# Patient Record
Sex: MALE | Race: BLACK OR AFRICAN AMERICAN | Employment: FULL TIME | URBAN - METROPOLITAN AREA
[De-identification: names, ages, dates, MRNs, and addresses within clinical notes are randomized per-mention and may not be internally consistent; named-entity substitution may affect disease eponyms.]

---

## 2017-06-12 ENCOUNTER — TELEPHONE (OUTPATIENT)
Dept: INTERNAL MEDICINE CLINIC | Age: 52
End: 2017-06-12

## 2017-06-12 DIAGNOSIS — M54.2 NECK PAIN: Primary | ICD-10-CM

## 2018-07-16 ENCOUNTER — OFFICE VISIT (OUTPATIENT)
Dept: INTERNAL MEDICINE CLINIC | Age: 53
End: 2018-07-16
Payer: COMMERCIAL

## 2018-07-16 VITALS
WEIGHT: 166 LBS | DIASTOLIC BLOOD PRESSURE: 78 MMHG | SYSTOLIC BLOOD PRESSURE: 126 MMHG | RESPIRATION RATE: 18 BRPM | OXYGEN SATURATION: 100 % | HEIGHT: 71 IN | HEART RATE: 68 BPM | BODY MASS INDEX: 23.24 KG/M2

## 2018-07-16 DIAGNOSIS — M54.2 MUSCULOSKELETAL NECK PAIN: ICD-10-CM

## 2018-07-16 DIAGNOSIS — M62.838 TRAPEZIUS MUSCLE SPASM: Primary | ICD-10-CM

## 2018-07-16 DIAGNOSIS — N52.8 OTHER MALE ERECTILE DYSFUNCTION: ICD-10-CM

## 2018-07-16 DIAGNOSIS — G47.00 INSOMNIA, UNSPECIFIED TYPE: ICD-10-CM

## 2018-07-16 DIAGNOSIS — Z72.0 TOBACCO ABUSE: ICD-10-CM

## 2018-07-16 DIAGNOSIS — R53.83 OTHER FATIGUE: ICD-10-CM

## 2018-07-16 DIAGNOSIS — Z71.6 TOBACCO ABUSE COUNSELING: ICD-10-CM

## 2018-07-16 PROCEDURE — G8420 CALC BMI NORM PARAMETERS: HCPCS | Performed by: FAMILY MEDICINE

## 2018-07-16 PROCEDURE — 4004F PT TOBACCO SCREEN RCVD TLK: CPT | Performed by: FAMILY MEDICINE

## 2018-07-16 PROCEDURE — 99213 OFFICE O/P EST LOW 20 MIN: CPT | Performed by: FAMILY MEDICINE

## 2018-07-16 PROCEDURE — G8427 DOCREV CUR MEDS BY ELIG CLIN: HCPCS | Performed by: FAMILY MEDICINE

## 2018-07-16 PROCEDURE — 3017F COLORECTAL CA SCREEN DOC REV: CPT | Performed by: FAMILY MEDICINE

## 2018-07-16 RX ORDER — CYCLOBENZAPRINE HCL 5 MG
5 TABLET ORAL 2 TIMES DAILY PRN
Qty: 30 TABLET | Refills: 0 | Status: SHIPPED | OUTPATIENT
Start: 2018-07-16 | End: 2018-07-26

## 2018-07-16 RX ORDER — DULOXETIN HYDROCHLORIDE 30 MG/1
30 CAPSULE, DELAYED RELEASE ORAL DAILY
Qty: 30 CAPSULE | Refills: 3 | Status: SHIPPED | OUTPATIENT
Start: 2018-07-16 | End: 2019-08-01 | Stop reason: CLARIF

## 2018-07-16 ASSESSMENT — PATIENT HEALTH QUESTIONNAIRE - PHQ9
SUM OF ALL RESPONSES TO PHQ QUESTIONS 1-9: 0
1. LITTLE INTEREST OR PLEASURE IN DOING THINGS: 0
2. FEELING DOWN, DEPRESSED OR HOPELESS: 0
SUM OF ALL RESPONSES TO PHQ9 QUESTIONS 1 & 2: 0

## 2018-07-17 ASSESSMENT — ENCOUNTER SYMPTOMS
EYES NEGATIVE: 1
RESPIRATORY NEGATIVE: 1
GASTROINTESTINAL NEGATIVE: 1
ALLERGIC/IMMUNOLOGIC NEGATIVE: 1

## 2018-07-17 NOTE — PROGRESS NOTES
Subjective:      Patient ID: Barb Arrington is a 48 y.o. male. Neck Pain    This is a chronic problem. The current episode started more than 1 month ago. The problem occurs constantly. The problem has been unchanged. The pain is associated with an unknown factor. The pain is present in the left side. The quality of the pain is described as cramping. The pain is at a severity of 5/10. The pain is moderate. The symptoms are aggravated by stress. The pain is same all the time. Stiffness is present all day. He has tried NSAIDs for the symptoms. The treatment provided mild relief. Review of Systems   Constitutional: Negative. HENT: Negative. Eyes: Negative. Respiratory: Negative. Cardiovascular: Negative. Gastrointestinal: Negative. Endocrine: Negative. Musculoskeletal: Positive for arthralgias, myalgias and neck pain. Skin: Negative. Allergic/Immunologic: Negative. Neurological: Negative. Hematological: Negative. Psychiatric/Behavioral: Positive for dysphoric mood. The patient is nervous/anxious. Past family and social history unremarkable. Objective:   Physical Exam   Constitutional: He is oriented to person, place, and time. He appears well-developed and well-nourished. HENT:   Head: Normocephalic and atraumatic. Right Ear: External ear normal.   Left Ear: External ear normal.   Nose: Nose normal.   Mouth/Throat: Oropharynx is clear and moist.   Eyes: Conjunctivae and EOM are normal. Pupils are equal, round, and reactive to light. Neck: Normal range of motion. Neck supple. Cardiovascular: Normal rate, regular rhythm, normal heart sounds and intact distal pulses. Pulmonary/Chest: Effort normal and breath sounds normal.   Abdominal: Soft. Bowel sounds are normal.   Musculoskeletal: Normal range of motion. Left sided trapezius spasm. No interspinous tenderness or step-off. Good active range of motion.   Neurologically intact   Neurological: He is alert and oriented to person, place, and time. He has normal reflexes. Skin: Skin is warm and dry. Psychiatric:   Anxiety with underlying depression. Chronic pain syndrome       Assessment:       Diagnosis Orders   1. Trapezius muscle spasm  XR CERVICAL SPINE (2-3 VIEWS)    External Referral To Physical Therapy   2. Musculoskeletal neck pain  XR CERVICAL SPINE (2-3 VIEWS)    External Referral To Physical Therapy   3. Insomnia, unspecified type  Urine Drug Screen    Hepatitis Panel, Acute    HIV Rapid 1&2    VDRL, QUANTITATIVE    CBC    Comprehensive Metabolic Panel    Hemoglobin A1C    Lipid Panel    Urinalysis with Microscopic    TSH without Reflex    Psa screening   4. Other fatigue  Urine Drug Screen    Hepatitis Panel, Acute    HIV Rapid 1&2    VDRL, QUANTITATIVE    CBC    Comprehensive Metabolic Panel    Hemoglobin A1C    Lipid Panel    Urinalysis with Microscopic    TSH without Reflex    Psa screening   5. Other male erectile dysfunction     6. Tobacco abuse     7. Tobacco abuse counseling             Plan:      51-year-old right-hand-dominant -American male who is employed at [employer] is presented with subjective complaint of neck pain. Patient is established with pain management. Clinical examination is consistent with left trapezius spasm. .  Reassurance provided. Continue increasing range of motion as tolerated. Over-the-counter massage cream, moist heat. He is placed on muscle relaxant. Sedative precaution provided. Caution driving and operating  Underlying history of chronic pain syndrome. Patient is advised to continue follow-up with his pain management. He expresses significant anxiety. I placed him on Cymbalta  Subjective complaint of fatigue is multifactorial.  We will follow along with the labs. Reassurance provided. Observe closely  Erectile dysfunction  He continued to smoke despite advise.   Once again is counseled, quit date, alternatives to consider  Insomnia that appears to be

## 2019-08-01 ENCOUNTER — OFFICE VISIT (OUTPATIENT)
Dept: INTERNAL MEDICINE CLINIC | Age: 54
End: 2019-08-01
Payer: COMMERCIAL

## 2019-08-01 VITALS
WEIGHT: 177.2 LBS | DIASTOLIC BLOOD PRESSURE: 80 MMHG | OXYGEN SATURATION: 98 % | HEART RATE: 62 BPM | BODY MASS INDEX: 24.81 KG/M2 | SYSTOLIC BLOOD PRESSURE: 122 MMHG | RESPIRATION RATE: 15 BRPM | HEIGHT: 71 IN

## 2019-08-01 DIAGNOSIS — N20.0 RENAL STONES: ICD-10-CM

## 2019-08-01 DIAGNOSIS — J34.2 DEFLECTED NASAL SEPTUM: ICD-10-CM

## 2019-08-01 DIAGNOSIS — J31.0 CHRONIC RHINITIS: ICD-10-CM

## 2019-08-01 DIAGNOSIS — J34.3 HYPERTROPHY OF BOTH INFERIOR NASAL TURBINATES: ICD-10-CM

## 2019-08-01 DIAGNOSIS — N52.01 ERECTILE DYSFUNCTION DUE TO ARTERIAL INSUFFICIENCY: ICD-10-CM

## 2019-08-01 DIAGNOSIS — Z87.891 FORMER SMOKER: ICD-10-CM

## 2019-08-01 DIAGNOSIS — R04.0 EPISTAXIS: Primary | ICD-10-CM

## 2019-08-01 PROBLEM — Z71.6 TOBACCO ABUSE COUNSELING: Status: RESOLVED | Noted: 2018-07-16 | Resolved: 2019-08-01

## 2019-08-01 PROCEDURE — 99214 OFFICE O/P EST MOD 30 MIN: CPT | Performed by: FAMILY MEDICINE

## 2019-08-01 PROCEDURE — 1036F TOBACCO NON-USER: CPT | Performed by: FAMILY MEDICINE

## 2019-08-01 PROCEDURE — 3017F COLORECTAL CA SCREEN DOC REV: CPT | Performed by: FAMILY MEDICINE

## 2019-08-01 PROCEDURE — G8420 CALC BMI NORM PARAMETERS: HCPCS | Performed by: FAMILY MEDICINE

## 2019-08-01 PROCEDURE — G8427 DOCREV CUR MEDS BY ELIG CLIN: HCPCS | Performed by: FAMILY MEDICINE

## 2019-08-01 RX ORDER — ECHINACEA PURPUREA EXTRACT 125 MG
1 TABLET ORAL PRN
Qty: 1 BOTTLE | Refills: 3 | Status: SHIPPED | OUTPATIENT
Start: 2019-08-01 | End: 2020-09-21

## 2019-08-01 RX ORDER — TADALAFIL 5 MG/1
5 TABLET ORAL DAILY PRN
Qty: 10 TABLET | Refills: 2 | Status: SHIPPED | OUTPATIENT
Start: 2019-08-01 | End: 2019-11-26 | Stop reason: SDUPTHER

## 2019-08-01 ASSESSMENT — ENCOUNTER SYMPTOMS
GASTROINTESTINAL NEGATIVE: 1
EYES NEGATIVE: 1
ALLERGIC/IMMUNOLOGIC NEGATIVE: 1
RESPIRATORY NEGATIVE: 1

## 2019-08-01 ASSESSMENT — PATIENT HEALTH QUESTIONNAIRE - PHQ9
SUM OF ALL RESPONSES TO PHQ QUESTIONS 1-9: 0
SUM OF ALL RESPONSES TO PHQ9 QUESTIONS 1 & 2: 0
SUM OF ALL RESPONSES TO PHQ QUESTIONS 1-9: 0
1. LITTLE INTEREST OR PLEASURE IN DOING THINGS: 0
2. FEELING DOWN, DEPRESSED OR HOPELESS: 0

## 2019-11-26 RX ORDER — TADALAFIL 5 MG/1
5 TABLET ORAL DAILY PRN
Qty: 10 TABLET | Refills: 1 | Status: SHIPPED | OUTPATIENT
Start: 2019-11-26 | End: 2020-01-03 | Stop reason: ALTCHOICE

## 2020-01-03 ENCOUNTER — OFFICE VISIT (OUTPATIENT)
Dept: INTERNAL MEDICINE CLINIC | Age: 55
End: 2020-01-03
Payer: COMMERCIAL

## 2020-01-03 VITALS
RESPIRATION RATE: 18 BRPM | HEIGHT: 71 IN | SYSTOLIC BLOOD PRESSURE: 130 MMHG | BODY MASS INDEX: 25.98 KG/M2 | OXYGEN SATURATION: 98 % | TEMPERATURE: 96 F | HEART RATE: 70 BPM | DIASTOLIC BLOOD PRESSURE: 80 MMHG | WEIGHT: 185.6 LBS

## 2020-01-03 PROBLEM — M17.0 PRIMARY OSTEOARTHRITIS OF BOTH KNEES: Status: ACTIVE | Noted: 2020-01-03

## 2020-01-03 PROBLEM — Z83.3 FAMILY HISTORY OF DIABETES MELLITUS (DM): Status: ACTIVE | Noted: 2020-01-03

## 2020-01-03 PROBLEM — J34.2 DEFLECTED NASAL SEPTUM: Status: RESOLVED | Noted: 2019-08-01 | Resolved: 2020-01-03

## 2020-01-03 PROBLEM — R04.0 EPISTAXIS: Status: RESOLVED | Noted: 2019-08-01 | Resolved: 2020-01-03

## 2020-01-03 PROBLEM — J34.3 HYPERTROPHY OF BOTH INFERIOR NASAL TURBINATES: Status: RESOLVED | Noted: 2019-08-01 | Resolved: 2020-01-03

## 2020-01-03 PROCEDURE — 3017F COLORECTAL CA SCREEN DOC REV: CPT | Performed by: FAMILY MEDICINE

## 2020-01-03 PROCEDURE — G8484 FLU IMMUNIZE NO ADMIN: HCPCS | Performed by: FAMILY MEDICINE

## 2020-01-03 PROCEDURE — 1036F TOBACCO NON-USER: CPT | Performed by: FAMILY MEDICINE

## 2020-01-03 PROCEDURE — G8419 CALC BMI OUT NRM PARAM NOF/U: HCPCS | Performed by: FAMILY MEDICINE

## 2020-01-03 PROCEDURE — G8427 DOCREV CUR MEDS BY ELIG CLIN: HCPCS | Performed by: FAMILY MEDICINE

## 2020-01-03 PROCEDURE — 99214 OFFICE O/P EST MOD 30 MIN: CPT | Performed by: FAMILY MEDICINE

## 2020-01-03 RX ORDER — SILDENAFIL 50 MG/1
50 TABLET, FILM COATED ORAL PRN
Qty: 10 TABLET | Refills: 1 | Status: SHIPPED | OUTPATIENT
Start: 2020-01-03 | End: 2020-01-15 | Stop reason: DRUGHIGH

## 2020-01-03 ASSESSMENT — PATIENT HEALTH QUESTIONNAIRE - PHQ9
SUM OF ALL RESPONSES TO PHQ9 QUESTIONS 1 & 2: 0
SUM OF ALL RESPONSES TO PHQ QUESTIONS 1-9: 0
1. LITTLE INTEREST OR PLEASURE IN DOING THINGS: 0
2. FEELING DOWN, DEPRESSED OR HOPELESS: 0
SUM OF ALL RESPONSES TO PHQ QUESTIONS 1-9: 0

## 2020-01-03 ASSESSMENT — ENCOUNTER SYMPTOMS
ALLERGIC/IMMUNOLOGIC NEGATIVE: 1
EYES NEGATIVE: 1
GASTROINTESTINAL NEGATIVE: 1
RESPIRATORY NEGATIVE: 1

## 2020-01-14 ENCOUNTER — TELEPHONE (OUTPATIENT)
Dept: INTERNAL MEDICINE CLINIC | Age: 55
End: 2020-01-14

## 2020-01-14 RX ORDER — SILDENAFIL CITRATE 50 MG
50 TABLET ORAL PRN
Qty: 10 TABLET | Refills: 1 | Status: CANCELLED | OUTPATIENT
Start: 2020-01-14

## 2020-01-14 NOTE — TELEPHONE ENCOUNTER
Pt called to request brand name viagra at higher dose  Filled generic viagra 50 mg 1/3/20 #10 with 1 RF

## 2020-01-15 RX ORDER — SILDENAFIL CITRATE 100 MG
100 TABLET ORAL PRN
Qty: 30 TABLET | Refills: 3 | Status: SHIPPED | OUTPATIENT
Start: 2020-01-15 | End: 2020-01-16 | Stop reason: SDUPTHER

## 2020-01-15 NOTE — TELEPHONE ENCOUNTER
Viagra 100 mg p.o. as needed #10 with 1 refill.   He may discuss with his urologist Dr. Brock Darnell

## 2020-01-15 NOTE — TELEPHONE ENCOUNTER
Patient mail box full unable to leave message  Medication may not be covered patient may have to pay out of pocket

## 2020-01-16 RX ORDER — SILDENAFIL 100 MG/1
100 TABLET, FILM COATED ORAL PRN
Qty: 30 TABLET | Refills: 3
Start: 2020-01-16 | End: 2020-09-21 | Stop reason: ALTCHOICE

## 2020-03-09 RX ORDER — TADALAFIL 5 MG/1
TABLET ORAL
Qty: 15 TABLET | Refills: 3 | Status: SHIPPED | OUTPATIENT
Start: 2020-03-09 | End: 2020-09-21

## 2020-03-10 ENCOUNTER — TELEPHONE (OUTPATIENT)
Dept: ENDOCRINOLOGY | Age: 55
End: 2020-03-10

## 2020-09-21 ENCOUNTER — OFFICE VISIT (OUTPATIENT)
Dept: INTERNAL MEDICINE CLINIC | Age: 55
End: 2020-09-21
Payer: COMMERCIAL

## 2020-09-21 VITALS
TEMPERATURE: 97.7 F | BODY MASS INDEX: 25.48 KG/M2 | SYSTOLIC BLOOD PRESSURE: 148 MMHG | WEIGHT: 182 LBS | HEIGHT: 71 IN | OXYGEN SATURATION: 98 % | HEART RATE: 72 BPM | DIASTOLIC BLOOD PRESSURE: 88 MMHG

## 2020-09-21 PROCEDURE — 99214 OFFICE O/P EST MOD 30 MIN: CPT | Performed by: FAMILY MEDICINE

## 2020-09-21 RX ORDER — BUSPIRONE HYDROCHLORIDE 7.5 MG/1
7.5 TABLET ORAL 2 TIMES DAILY
Qty: 60 TABLET | Refills: 0 | Status: SHIPPED | OUTPATIENT
Start: 2020-09-21 | End: 2020-10-21

## 2020-09-21 NOTE — PROGRESS NOTES
Subjective:      Patient ID: Valeria Graham is a 50 y.o. male. 53-year-old -American male who says that his mother recently  and is becoming very anxious, however, he denies being depressed or suicidal      Review of Systems   Constitutional: Negative. HENT: Negative. Eyes: Negative. Respiratory: Negative. Cardiovascular: Negative. Gastrointestinal: Negative. Endocrine: Negative. Musculoskeletal: Negative. Skin: Negative. Allergic/Immunologic: Negative. Neurological: Negative. Hematological: Negative. Psychiatric/Behavioral: The patient is nervous/anxious. Past family and social history unremarkable. Diagnosis Orders   1. Erectile dysfunction due to arterial insufficiency     2. Renal stones  CBC    Comprehensive Metabolic Panel    Hemoglobin A1C    Lipid Panel    Urinalysis with Microscopic    TSH without Reflex    Psa screening   3. Former smoker     4. Chronic rhinitis     5. Primary osteoarthritis of both knees  [address]   6. Family history of diabetes mellitus (DM)  CBC    Comprehensive Metabolic Panel    Hemoglobin A1C    Lipid Panel    Urinalysis with Microscopic    TSH without Reflex    Psa screening   7. PTSD (post-traumatic stress disorder)  CBC    Comprehensive Metabolic Panel    Hemoglobin A1C    Lipid Panel    Urinalysis with Microscopic    TSH without Reflex    Psa screening    Vernell Flynn, PhD, Psychology, [place]         Objective:   Physical Exam  Vitals signs and nursing note reviewed. Constitutional:       Appearance: He is well-developed. HENT:      Head: Normocephalic and atraumatic. Right Ear: External ear normal.      Left Ear: External ear normal.      Nose: Nose normal.      Comments: Allergies allergic rhinitis  Eyes:      Conjunctiva/sclera: Conjunctivae normal.      Pupils: Pupils are equal, round, and reactive to light. Neck:      Musculoskeletal: Normal range of motion and neck supple. Cardiovascular:      Rate and Rhythm: Normal rate and regular rhythm. Heart sounds: Normal heart sounds. Pulmonary:      Effort: Pulmonary effort is normal.      Breath sounds: Normal breath sounds. Abdominal:      General: Bowel sounds are normal.      Palpations: Abdomen is soft. Genitourinary:     Comments: Asymptomatic kidney stones  Erectile dysfunction  Musculoskeletal: Normal range of motion. Comments: Arthritis of the knees   Skin:     General: Skin is warm and dry. Neurological:      Mental Status: He is alert and oriented to person, place, and time. Deep Tendon Reflexes: Reflexes are normal and symmetric. Psychiatric:      Comments: Anxiety, PTSD  He denies suicidality  He agrees to [employer] and psych consultation         Assessment:       Diagnosis Orders   1. Erectile dysfunction due to arterial insufficiency     2. Renal stones  CBC    Comprehensive Metabolic Panel    Hemoglobin A1C    Lipid Panel    Urinalysis with Microscopic    TSH without Reflex    Psa screening   3. Former smoker     4. Chronic rhinitis     5. Primary osteoarthritis of both knees  [address]   6. Family history of diabetes mellitus (DM)  CBC    Comprehensive Metabolic Panel    Hemoglobin A1C    Lipid Panel    Urinalysis with Microscopic    TSH without Reflex    Psa screening   7. PTSD (post-traumatic stress disorder)  CBC    Comprehensive Metabolic Panel    Hemoglobin A1C    Lipid Panel    Urinalysis with Microscopic    TSH without Reflex    Psa screening    Kb Bruce, PhD, Psychology, [facility]           Plan:      46-year-old -American male is presenting complaining of increasing anxiety since recent. However, he denies being depressed. Patient is counseled. He agrees to [employer] and psych consultation. History of arthritis of the knees. Asymptomatic. Continue activity as tolerated, quadricep strengthening program as demonstrated.   May take over-the-counter pain medications as needed  Erectile dysfunction  Positive family history of diabetes mellitus. Risk factor stratification is advised. He will benefit from low-fat high-fiber diet, daily moderate exercise and lifestyle change  History of asymptomatic kidney stone. Maintain oral hydration  History of allergies allergic rhinitis. No recent flare. He may take over-the-counter nasal saline, antihistamines  Former smoker  History of renal insufficiency. Maintain oral hydration, avoidance of nephrotoxic drugs, anti-inflammatory radiocontrast  He denies excessive alcohol or illicit drug use  Medically stable advised to continue  Further recommendations to follow labs  He is encouraged to call for any concern  This note is created with a voice recognition program and while intend to generate a document that accurately reflects the content of the visit, no guarantee can be provided that every mistake has been identified and corrected by editing.           Yadi Baker MD

## 2020-09-22 ENCOUNTER — TELEPHONE (OUTPATIENT)
Dept: INTERNAL MEDICINE CLINIC | Age: 55
End: 2020-09-22

## 2020-09-22 NOTE — TELEPHONE ENCOUNTER
Spoke with 's OT. States they do not perform services related to work abilities. Recommends pt contact HR for further guidance. Please advise.

## 2020-09-22 NOTE — TELEPHONE ENCOUNTER
Occupational Therapy consult was requested because patient wished to have time off from work because of his personal situations

## 2020-09-23 NOTE — TELEPHONE ENCOUNTER
[facility] called back this morning. States she spoke with supervisor who advised her pt needs referral to PT with note for FCE, done.

## 2023-01-31 NOTE — TELEPHONE ENCOUNTER
[facility] stating that they received an order for OT but they believe that it should have been for PT    Asking if so please put in a new order    please advise Warm

## 2023-10-05 ENCOUNTER — OFFICE VISIT (OUTPATIENT)
Dept: PRIMARY CARE CLINIC | Age: 58
End: 2023-10-05
Payer: COMMERCIAL

## 2023-10-05 VITALS
HEIGHT: 71 IN | BODY MASS INDEX: 24.36 KG/M2 | OXYGEN SATURATION: 98 % | RESPIRATION RATE: 16 BRPM | SYSTOLIC BLOOD PRESSURE: 132 MMHG | HEART RATE: 64 BPM | WEIGHT: 174 LBS | DIASTOLIC BLOOD PRESSURE: 82 MMHG

## 2023-10-05 DIAGNOSIS — Z13.0 SCREENING FOR DEFICIENCY ANEMIA: ICD-10-CM

## 2023-10-05 DIAGNOSIS — R21 RASH OF MULTIPLE FINGERS OF BOTH HANDS: Primary | ICD-10-CM

## 2023-10-05 DIAGNOSIS — Z23 NEED FOR INFLUENZA VACCINATION: ICD-10-CM

## 2023-10-05 DIAGNOSIS — Z12.11 SCREEN FOR COLON CANCER: ICD-10-CM

## 2023-10-05 DIAGNOSIS — Z12.5 SCREENING PSA (PROSTATE SPECIFIC ANTIGEN): ICD-10-CM

## 2023-10-05 DIAGNOSIS — Z13.220 ENCOUNTER FOR LIPID SCREENING FOR CARDIOVASCULAR DISEASE: ICD-10-CM

## 2023-10-05 DIAGNOSIS — Z13.6 ENCOUNTER FOR LIPID SCREENING FOR CARDIOVASCULAR DISEASE: ICD-10-CM

## 2023-10-05 PROBLEM — N13.9 OBSTRUCTIVE UROPATHY: Status: ACTIVE | Noted: 2018-02-03

## 2023-10-05 PROCEDURE — 1036F TOBACCO NON-USER: CPT | Performed by: PHYSICIAN ASSISTANT

## 2023-10-05 PROCEDURE — 99203 OFFICE O/P NEW LOW 30 MIN: CPT | Performed by: PHYSICIAN ASSISTANT

## 2023-10-05 PROCEDURE — G8427 DOCREV CUR MEDS BY ELIG CLIN: HCPCS | Performed by: PHYSICIAN ASSISTANT

## 2023-10-05 PROCEDURE — 90471 IMMUNIZATION ADMIN: CPT | Performed by: PHYSICIAN ASSISTANT

## 2023-10-05 PROCEDURE — G8482 FLU IMMUNIZE ORDER/ADMIN: HCPCS | Performed by: PHYSICIAN ASSISTANT

## 2023-10-05 PROCEDURE — 90674 CCIIV4 VAC NO PRSV 0.5 ML IM: CPT | Performed by: PHYSICIAN ASSISTANT

## 2023-10-05 PROCEDURE — G8420 CALC BMI NORM PARAMETERS: HCPCS | Performed by: PHYSICIAN ASSISTANT

## 2023-10-05 PROCEDURE — 3017F COLORECTAL CA SCREEN DOC REV: CPT | Performed by: PHYSICIAN ASSISTANT

## 2023-10-05 RX ORDER — TRIAMCINOLONE ACETONIDE 0.25 MG/G
OINTMENT TOPICAL
Qty: 80 G | Refills: 0 | Status: SHIPPED | OUTPATIENT
Start: 2023-10-05 | End: 2023-10-12

## 2023-10-05 SDOH — ECONOMIC STABILITY: INCOME INSECURITY: HOW HARD IS IT FOR YOU TO PAY FOR THE VERY BASICS LIKE FOOD, HOUSING, MEDICAL CARE, AND HEATING?: NOT HARD AT ALL

## 2023-10-05 SDOH — ECONOMIC STABILITY: FOOD INSECURITY: WITHIN THE PAST 12 MONTHS, YOU WORRIED THAT YOUR FOOD WOULD RUN OUT BEFORE YOU GOT MONEY TO BUY MORE.: NEVER TRUE

## 2023-10-05 SDOH — ECONOMIC STABILITY: HOUSING INSECURITY
IN THE LAST 12 MONTHS, WAS THERE A TIME WHEN YOU DID NOT HAVE A STEADY PLACE TO SLEEP OR SLEPT IN A SHELTER (INCLUDING NOW)?: NO

## 2023-10-05 SDOH — ECONOMIC STABILITY: FOOD INSECURITY: WITHIN THE PAST 12 MONTHS, THE FOOD YOU BOUGHT JUST DIDN'T LAST AND YOU DIDN'T HAVE MONEY TO GET MORE.: NEVER TRUE

## 2023-10-05 ASSESSMENT — PATIENT HEALTH QUESTIONNAIRE - PHQ9
SUM OF ALL RESPONSES TO PHQ QUESTIONS 1-9: 0
2. FEELING DOWN, DEPRESSED OR HOPELESS: 0
SUM OF ALL RESPONSES TO PHQ9 QUESTIONS 1 & 2: 0
1. LITTLE INTEREST OR PLEASURE IN DOING THINGS: 0
SUM OF ALL RESPONSES TO PHQ QUESTIONS 1-9: 0

## 2023-10-05 ASSESSMENT — ENCOUNTER SYMPTOMS
SHORTNESS OF BREATH: 0
VOMITING: 0
NAUSEA: 0
EYE PAIN: 0
RHINORRHEA: 0
BACK PAIN: 0
COUGH: 0
ABDOMINAL PAIN: 0
CONSTIPATION: 0
DIARRHEA: 0
SINUS PAIN: 0

## 2023-10-05 NOTE — PROGRESS NOTES
[address] [place] [address] PRIMARY CARE  [address]  1 [facility] ,[address] [address] [address] [address]  Dept: [phone]  Dept Fax: [phone]    Jessica Boyd is a 66 y.o. male who presents today for his medical conditions/complaints as noted below. Chief Complaint   Patient presents with    Establish Care     Rash on hands on and off x 3 months, has used OTC creams and medications with no relief, has had no changes to soaps or shampoos or detergents       HPI:     Presents to the office to establish care. No recent PCP. He does not take daily medication for chronic medical illness. Primary concern today is discussing rash on his hands. This been intermittent for the last 3 months. It is concentrated to the interdigital space on both hands. Right hand is worse than left. He describes extreme itching and irritations when it is at its worst.  He denies any new exposures to soaps, washes, chemicals, detergents. No new pets. He has tried [employer], cortisone, Aquaphor, Benadryl with minimal relief. Denies any rash on any other area of body. No history of similar rash. Otherwise, no concerns or complaints. Denies any lightheadedness, dizziness, shortness of breath, chest pain, leg edema, syncope. We reviewed health maintenance and we will update accordingly. BP stable. Weight stable. No results found for: \"LABA1C\"          ( goal A1C is < 7)   No components found for: \"LABMICR\"  LDL Cholesterol (mg/dL)   Date Value   03/28/2016 114       (goal LDL is <100)   AST (U/L)   Date Value   03/28/2016 16     ALT (U/L)   Date Value   03/28/2016 14     BUN (mg/dL)   Date Value   03/28/2016 13     BP Readings from Last 3 Encounters:   10/05/23 132/82   09/21/20 (!) 148/88   01/03/20 130/80          (goal 120/80)    History reviewed. No pertinent past medical history.    Past Surgical History:   Procedure Laterality Date    PRE-MALIGNANT / BENIGN SKIN LESION EXCISION  3/21/13    excision

## 2024-06-11 ENCOUNTER — HOSPITAL ENCOUNTER (OUTPATIENT)
Age: 59
Discharge: HOME | End: 2024-06-13
Payer: COMMERCIAL

## 2024-06-11 ENCOUNTER — HOSPITAL ENCOUNTER (OUTPATIENT)
Dept: GENERAL RADIOLOGY | Age: 59
Discharge: HOME | End: 2024-06-13
Payer: COMMERCIAL

## 2024-06-11 ENCOUNTER — OFFICE VISIT (OUTPATIENT)
Dept: PRIMARY CARE CLINIC | Age: 59
End: 2024-06-11
Payer: COMMERCIAL

## 2024-06-11 VITALS
SYSTOLIC BLOOD PRESSURE: 126 MMHG | BODY MASS INDEX: 25.52 KG/M2 | WEIGHT: 183 LBS | RESPIRATION RATE: 12 BRPM | HEART RATE: 64 BPM | OXYGEN SATURATION: 98 % | DIASTOLIC BLOOD PRESSURE: 76 MMHG

## 2024-06-11 DIAGNOSIS — M79.671 PAIN OF RIGHT HEEL: ICD-10-CM

## 2024-06-11 DIAGNOSIS — M79.671 PAIN OF RIGHT HEEL: Primary | ICD-10-CM

## 2024-06-11 PROCEDURE — 1036F TOBACCO NON-USER: CPT | Performed by: NURSE PRACTITIONER

## 2024-06-11 PROCEDURE — 73630 X-RAY EXAM OF FOOT: CPT

## 2024-06-11 PROCEDURE — G8427 DOCREV CUR MEDS BY ELIG CLIN: HCPCS | Performed by: NURSE PRACTITIONER

## 2024-06-11 PROCEDURE — G8419 CALC BMI OUT NRM PARAM NOF/U: HCPCS | Performed by: NURSE PRACTITIONER

## 2024-06-11 PROCEDURE — 3017F COLORECTAL CA SCREEN DOC REV: CPT | Performed by: NURSE PRACTITIONER

## 2024-06-11 PROCEDURE — 99213 OFFICE O/P EST LOW 20 MIN: CPT | Performed by: NURSE PRACTITIONER

## 2024-06-11 RX ORDER — METHYLPREDNISOLONE 4 MG/1
TABLET ORAL
Qty: 1 KIT | Refills: 0 | Status: SHIPPED | OUTPATIENT
Start: 2024-06-11 | End: 2024-06-17

## 2024-06-11 ASSESSMENT — ENCOUNTER SYMPTOMS
NAUSEA: 0
DIARRHEA: 0
TROUBLE SWALLOWING: 0
WHEEZING: 0
SINUS PRESSURE: 0
VOMITING: 0
COUGH: 0
EYE REDNESS: 0
EYE DISCHARGE: 0
CHEST TIGHTNESS: 0
EYE ITCHING: 0
SHORTNESS OF BREATH: 0
SINUS PAIN: 0
SORE THROAT: 0
ABDOMINAL PAIN: 0

## 2024-06-11 ASSESSMENT — PATIENT HEALTH QUESTIONNAIRE - PHQ9
SUM OF ALL RESPONSES TO PHQ QUESTIONS 1-9: 0
SUM OF ALL RESPONSES TO PHQ QUESTIONS 1-9: 0
1. LITTLE INTEREST OR PLEASURE IN DOING THINGS: NOT AT ALL
2. FEELING DOWN, DEPRESSED OR HOPELESS: NOT AT ALL
SUM OF ALL RESPONSES TO PHQ QUESTIONS 1-9: 0
SUM OF ALL RESPONSES TO PHQ QUESTIONS 1-9: 0
SUM OF ALL RESPONSES TO PHQ9 QUESTIONS 1 & 2: 0

## 2024-06-11 NOTE — PROGRESS NOTES
2     No current facility-administered medications for this visit.     No Known Allergies    Health Maintenance   Topic Date Due    Hepatitis B vaccine (1 of 3 - 3-dose series) Never done    HIV screen  Never done    Hepatitis C screen  Never done    DTaP/Tdap/Td vaccine (1 - Tdap) Never done    Diabetes screen  Never done    Colorectal Cancer Screen  Never done    Shingles vaccine (1 of 2) Never done    Lipids  03/28/2021    COVID-19 Vaccine (4 - 2023-24 season) 09/01/2023    Depression Screen  10/05/2024    Flu vaccine  Completed    Hepatitis A vaccine  Aged Out    Hib vaccine  Aged Out    Polio vaccine  Aged Out    Meningococcal (ACWY) vaccine  Aged Out    Pneumococcal 0-64 years Vaccine  Aged Out       :     Review of Systems   Constitutional:  Negative for chills, fatigue and fever.   HENT:  Negative for ear discharge, ear pain, sinus pressure, sinus pain, sore throat and trouble swallowing.    Eyes:  Negative for discharge, redness and itching.   Respiratory:  Negative for cough, chest tightness, shortness of breath and wheezing.    Cardiovascular:  Negative for chest pain.   Gastrointestinal:  Negative for abdominal pain, diarrhea, nausea and vomiting.   Genitourinary:  Negative for difficulty urinating.   Musculoskeletal:  Negative for arthralgias and neck pain.        Right heel pain    Skin:  Negative for rash.   Neurological:  Negative for dizziness, weakness, light-headedness and headaches.   All other systems reviewed and are negative.      Objective:     Physical Exam  Constitutional:       General: He is not in acute distress.     Appearance: Normal appearance. He is normal weight. He is not ill-appearing.   HENT:      Head: Normocephalic and atraumatic.      Nose: Nose normal. No congestion or rhinorrhea.      Mouth/Throat:      Mouth: Mucous membranes are moist.      Pharynx: Oropharynx is clear. No oropharyngeal exudate or posterior oropharyngeal erythema.   Eyes:      Extraocular Movements:

## 2024-07-15 ENCOUNTER — OFFICE VISIT (OUTPATIENT)
Dept: PRIMARY CARE CLINIC | Age: 59
End: 2024-07-15
Payer: COMMERCIAL

## 2024-07-15 VITALS
BODY MASS INDEX: 25.37 KG/M2 | WEIGHT: 181.2 LBS | SYSTOLIC BLOOD PRESSURE: 114 MMHG | OXYGEN SATURATION: 97 % | HEIGHT: 71 IN | HEART RATE: 70 BPM | DIASTOLIC BLOOD PRESSURE: 78 MMHG | RESPIRATION RATE: 16 BRPM

## 2024-07-15 DIAGNOSIS — M25.552 LEFT HIP PAIN: Primary | ICD-10-CM

## 2024-07-15 DIAGNOSIS — L30.9 ECZEMA OF BOTH HANDS: ICD-10-CM

## 2024-07-15 DIAGNOSIS — B36.9 FUNGAL RASH OF TRUNK: ICD-10-CM

## 2024-07-15 PROCEDURE — G8427 DOCREV CUR MEDS BY ELIG CLIN: HCPCS | Performed by: PHYSICIAN ASSISTANT

## 2024-07-15 PROCEDURE — 3017F COLORECTAL CA SCREEN DOC REV: CPT | Performed by: PHYSICIAN ASSISTANT

## 2024-07-15 PROCEDURE — 99214 OFFICE O/P EST MOD 30 MIN: CPT | Performed by: PHYSICIAN ASSISTANT

## 2024-07-15 PROCEDURE — 1036F TOBACCO NON-USER: CPT | Performed by: PHYSICIAN ASSISTANT

## 2024-07-15 PROCEDURE — G8419 CALC BMI OUT NRM PARAM NOF/U: HCPCS | Performed by: PHYSICIAN ASSISTANT

## 2024-07-15 RX ORDER — METHYLPREDNISOLONE 4 MG/1
TABLET ORAL
Qty: 1 KIT | Refills: 0 | Status: SHIPPED | OUTPATIENT
Start: 2024-07-15 | End: 2024-07-15 | Stop reason: SDUPTHER

## 2024-07-15 RX ORDER — HYDROXYZINE HYDROCHLORIDE 25 MG/1
25 TABLET, FILM COATED ORAL NIGHTLY PRN
Qty: 30 TABLET | Refills: 0 | Status: SHIPPED | OUTPATIENT
Start: 2024-07-15 | End: 2024-07-15 | Stop reason: SDUPTHER

## 2024-07-15 RX ORDER — TACROLIMUS 1 MG/G
OINTMENT TOPICAL
Qty: 30 G | Refills: 0 | Status: SHIPPED | OUTPATIENT
Start: 2024-07-15

## 2024-07-15 RX ORDER — FLUCONAZOLE 150 MG/1
150 TABLET ORAL ONCE
Qty: 1 TABLET | Refills: 0 | Status: SHIPPED | OUTPATIENT
Start: 2024-07-15 | End: 2024-07-15 | Stop reason: SDUPTHER

## 2024-07-15 RX ORDER — TACROLIMUS 1 MG/G
OINTMENT TOPICAL
Qty: 30 G | Refills: 0 | Status: SHIPPED | OUTPATIENT
Start: 2024-07-15 | End: 2024-07-15 | Stop reason: SDUPTHER

## 2024-07-15 RX ORDER — FLUCONAZOLE 150 MG/1
150 TABLET ORAL ONCE
Qty: 1 TABLET | Refills: 0 | Status: SHIPPED | OUTPATIENT
Start: 2024-07-15 | End: 2024-07-15

## 2024-07-15 RX ORDER — HYDROXYZINE HYDROCHLORIDE 25 MG/1
25 TABLET, FILM COATED ORAL NIGHTLY PRN
Qty: 30 TABLET | Refills: 0 | Status: SHIPPED | OUTPATIENT
Start: 2024-07-15 | End: 2024-08-14

## 2024-07-15 RX ORDER — METHYLPREDNISOLONE 4 MG/1
TABLET ORAL
Qty: 1 KIT | Refills: 0 | Status: SHIPPED | OUTPATIENT
Start: 2024-07-15 | End: 2024-07-21

## 2024-07-15 SDOH — ECONOMIC STABILITY: FOOD INSECURITY: WITHIN THE PAST 12 MONTHS, YOU WORRIED THAT YOUR FOOD WOULD RUN OUT BEFORE YOU GOT MONEY TO BUY MORE.: NEVER TRUE

## 2024-07-15 SDOH — ECONOMIC STABILITY: FOOD INSECURITY: WITHIN THE PAST 12 MONTHS, THE FOOD YOU BOUGHT JUST DIDN'T LAST AND YOU DIDN'T HAVE MONEY TO GET MORE.: NEVER TRUE

## 2024-07-15 SDOH — ECONOMIC STABILITY: INCOME INSECURITY: HOW HARD IS IT FOR YOU TO PAY FOR THE VERY BASICS LIKE FOOD, HOUSING, MEDICAL CARE, AND HEATING?: NOT VERY HARD

## 2024-07-15 ASSESSMENT — ENCOUNTER SYMPTOMS
ABDOMINAL PAIN: 0
COUGH: 0
SINUS PAIN: 0
RHINORRHEA: 0
BACK PAIN: 0
EYE PAIN: 0
DIARRHEA: 0
VOMITING: 0
SHORTNESS OF BREATH: 0
CONSTIPATION: 0
NAUSEA: 0

## 2024-07-15 ASSESSMENT — PATIENT HEALTH QUESTIONNAIRE - PHQ9
SUM OF ALL RESPONSES TO PHQ QUESTIONS 1-9: 0
1. LITTLE INTEREST OR PLEASURE IN DOING THINGS: NOT AT ALL
2. FEELING DOWN, DEPRESSED OR HOPELESS: NOT AT ALL
SUM OF ALL RESPONSES TO PHQ QUESTIONS 1-9: 0
SUM OF ALL RESPONSES TO PHQ9 QUESTIONS 1 & 2: 0

## 2024-07-15 NOTE — PROGRESS NOTES
by mouth once for 1 dose 1 tablet 0    hydrOXYzine HCl (ATARAX) 25 MG tablet Take 1 tablet by mouth nightly as needed for Itching 30 tablet 0    methylPREDNISolone (MEDROL, NELSNO,) 4 MG tablet Take by mouth. 1 kit 0    tacrolimus (PROTOPIC) 0.1 % ointment Apply topically 2 times daily. 30 g 0     No current facility-administered medications for this visit.     No Known Allergies    Health Maintenance   Topic Date Due    Hepatitis B vaccine (1 of 3 - 3-dose series) Never done    HIV screen  Never done    Hepatitis C screen  Never done    DTaP/Tdap/Td vaccine (1 - Tdap) Never done    Diabetes screen  Never done    Colorectal Cancer Screen  Never done    Shingles vaccine (1 of 2) Never done    Lipids  03/28/2021    COVID-19 Vaccine (4 - 2023-24 season) 09/01/2023    Flu vaccine (1) 08/01/2024    Depression Screen  07/15/2025    Hepatitis A vaccine  Aged Out    Hib vaccine  Aged Out    Polio vaccine  Aged Out    Meningococcal (ACWY) vaccine  Aged Out    Pneumococcal 0-64 years Vaccine  Aged Out       Subjective:      Review of Systems   Constitutional:  Negative for chills, fatigue and fever.   HENT:  Negative for congestion, rhinorrhea and sinus pain.    Eyes:  Negative for pain.   Respiratory:  Negative for cough and shortness of breath.    Cardiovascular:  Negative for chest pain and leg swelling.   Gastrointestinal:  Negative for abdominal pain, constipation, diarrhea, nausea and vomiting.   Genitourinary:  Negative for difficulty urinating, enuresis and testicular pain.   Musculoskeletal:  Positive for arthralgias. Negative for back pain and myalgias.   Skin:  Positive for rash.        Chronic rash on hands, new rash bilateral axillas and groin   Neurological:  Negative for dizziness and headaches.   Psychiatric/Behavioral:  Negative for confusion and sleep disturbance. The patient is not nervous/anxious.    All other systems reviewed and are negative.      Objective:     Physical Exam  Vitals and nursing note

## 2024-07-15 NOTE — TELEPHONE ENCOUNTER
Pt called into office stating he would like all his new medication that was sent in today sent to a different pharmacy.     Please advise  Thanks   Last OV:  7/15/2024    Next OV: 10/15/2024    Pharmacy:   [id number] [place] - [address] P [phone] - F [phone]  [address] [place] [address]  Phone: [phone] Fax: [phone]       Confirmed? Yes :

## 2024-08-07 ENCOUNTER — OFFICE VISIT (OUTPATIENT)
Dept: PRIMARY CARE CLINIC | Age: 59
End: 2024-08-07
Payer: COMMERCIAL

## 2024-08-07 VITALS
OXYGEN SATURATION: 97 % | HEIGHT: 71 IN | HEART RATE: 70 BPM | SYSTOLIC BLOOD PRESSURE: 128 MMHG | DIASTOLIC BLOOD PRESSURE: 80 MMHG | RESPIRATION RATE: 16 BRPM | BODY MASS INDEX: 25.28 KG/M2 | WEIGHT: 180.6 LBS

## 2024-08-07 DIAGNOSIS — B36.9 FUNGAL RASH OF TRUNK: ICD-10-CM

## 2024-08-07 DIAGNOSIS — L30.9 ECZEMA OF BOTH HANDS: Primary | ICD-10-CM

## 2024-08-07 DIAGNOSIS — A60.00 RECURRENT GENITAL HSV (HERPES SIMPLEX VIRUS) INFECTION: ICD-10-CM

## 2024-08-07 PROCEDURE — G8427 DOCREV CUR MEDS BY ELIG CLIN: HCPCS | Performed by: PHYSICIAN ASSISTANT

## 2024-08-07 PROCEDURE — 3017F COLORECTAL CA SCREEN DOC REV: CPT | Performed by: PHYSICIAN ASSISTANT

## 2024-08-07 PROCEDURE — 1036F TOBACCO NON-USER: CPT | Performed by: PHYSICIAN ASSISTANT

## 2024-08-07 PROCEDURE — G8419 CALC BMI OUT NRM PARAM NOF/U: HCPCS | Performed by: PHYSICIAN ASSISTANT

## 2024-08-07 PROCEDURE — 99214 OFFICE O/P EST MOD 30 MIN: CPT | Performed by: PHYSICIAN ASSISTANT

## 2024-08-07 RX ORDER — TACROLIMUS 1 MG/G
OINTMENT TOPICAL
Qty: 30 G | Refills: 0 | Status: SHIPPED | OUTPATIENT
Start: 2024-08-07

## 2024-08-07 RX ORDER — VALACYCLOVIR HYDROCHLORIDE 1 G/1
1000 TABLET, FILM COATED ORAL DAILY
Qty: 5 TABLET | Refills: 2 | Status: SHIPPED | OUTPATIENT
Start: 2024-08-07

## 2024-08-07 ASSESSMENT — ENCOUNTER SYMPTOMS
EYE PAIN: 0
CONSTIPATION: 0
BACK PAIN: 0
RHINORRHEA: 0
COUGH: 0
ABDOMINAL PAIN: 0
SINUS PAIN: 0
NAUSEA: 0
DIARRHEA: 0
SHORTNESS OF BREATH: 0
VOMITING: 0

## 2024-08-07 NOTE — PROGRESS NOTES
[facility]  [facility] PRIMARY CARE  [address] [place] [id number]  Dept: [phone]  Dept Fax: [phone]    Morris Rivera is a 59 y.o. male who presents today for his medical conditions/complaints as noted below.    Chief Complaint   Patient presents with    Rash     Patient presents for rash on hand. Onset x14 days.Rash has since reappeared since last visit.         HPI:     Patient presents the office for reevaluation of rash.  He continues to have rash on right hand.  He is having recurrence of rash on armpits and groin region.  He reports his rashes were much improved with Medrol Dosepak, but are slowly returning.  He is not using any tacrolimus or steroid ointment.  Describes itching to the rash.    Also of note he has developed a genital sore.  This is blisterlike with some itching/burning.  He has been told he has herpes in the past and responded to antiviral.  He would prescription.  No spreading.  No fevers or chills.  No abdominal pain.    No other acute changes or concerns.        No results found for: \"LABA1C\"          ( goal A1C is < 7)   No components found for: \"LABMICR\"  No components found for: \"LDLCHOLESTEROL\", \"LDLCALC\"    (goal LDL is <100)   AST (U/L)   Date Value   03/28/2016 16     ALT (U/L)   Date Value   03/28/2016 14     BUN (mg/dL)   Date Value   03/28/2016 13     BP Readings from Last 3 Encounters:   08/07/24 128/80   07/15/24 114/78   06/11/24 126/76          (goal 120/80)    History reviewed. No pertinent past medical history.   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

## 2024-08-12 NOTE — TELEPHONE ENCOUNTER
Patient called stating he seen PCP last week and the valtrex that was given is not working for him.     Patient would like to know if PCP can give him a different medication.

## 2024-08-12 NOTE — TELEPHONE ENCOUNTER
Pt called in stating the medication was sent to the wrong pharmacy, [facility] [address]     Please advise

## 2024-08-14 DIAGNOSIS — L30.9 ECZEMA OF BOTH HANDS: ICD-10-CM

## 2024-08-14 DIAGNOSIS — B36.9 FUNGAL RASH OF TRUNK: ICD-10-CM

## 2024-08-14 RX ORDER — HYDROXYZINE HYDROCHLORIDE 25 MG/1
25 TABLET, FILM COATED ORAL NIGHTLY PRN
Qty: 90 TABLET | Refills: 0 | Status: SHIPPED | OUTPATIENT
Start: 2024-08-14 | End: 2024-11-12

## 2025-05-29 ENCOUNTER — OFFICE VISIT (OUTPATIENT)
Dept: PRIMARY CARE CLINIC | Age: 60
End: 2025-05-29
Payer: COMMERCIAL

## 2025-05-29 ENCOUNTER — TELEPHONE (OUTPATIENT)
Dept: PRIMARY CARE CLINIC | Age: 60
End: 2025-05-29

## 2025-05-29 VITALS
OXYGEN SATURATION: 94 % | BODY MASS INDEX: 26.8 KG/M2 | HEIGHT: 71 IN | WEIGHT: 191.4 LBS | RESPIRATION RATE: 16 BRPM | HEART RATE: 71 BPM | SYSTOLIC BLOOD PRESSURE: 124 MMHG | DIASTOLIC BLOOD PRESSURE: 86 MMHG

## 2025-05-29 DIAGNOSIS — Z13.220 ENCOUNTER FOR LIPID SCREENING FOR CARDIOVASCULAR DISEASE: ICD-10-CM

## 2025-05-29 DIAGNOSIS — M79.644 PAIN OF RIGHT THUMB: ICD-10-CM

## 2025-05-29 DIAGNOSIS — Z13.6 ENCOUNTER FOR LIPID SCREENING FOR CARDIOVASCULAR DISEASE: ICD-10-CM

## 2025-05-29 DIAGNOSIS — Z00.00 ENCOUNTER FOR ANNUAL PHYSICAL EXAM: Primary | ICD-10-CM

## 2025-05-29 DIAGNOSIS — Z12.5 SCREENING PSA (PROSTATE SPECIFIC ANTIGEN): ICD-10-CM

## 2025-05-29 PROCEDURE — G8419 CALC BMI OUT NRM PARAM NOF/U: HCPCS | Performed by: PHYSICIAN ASSISTANT

## 2025-05-29 PROCEDURE — 1036F TOBACCO NON-USER: CPT | Performed by: PHYSICIAN ASSISTANT

## 2025-05-29 PROCEDURE — G8427 DOCREV CUR MEDS BY ELIG CLIN: HCPCS | Performed by: PHYSICIAN ASSISTANT

## 2025-05-29 PROCEDURE — 3017F COLORECTAL CA SCREEN DOC REV: CPT | Performed by: PHYSICIAN ASSISTANT

## 2025-05-29 PROCEDURE — 99213 OFFICE O/P EST LOW 20 MIN: CPT | Performed by: PHYSICIAN ASSISTANT

## 2025-05-29 PROCEDURE — 99396 PREV VISIT EST AGE 40-64: CPT | Performed by: PHYSICIAN ASSISTANT

## 2025-05-29 RX ORDER — METHYLPREDNISOLONE 4 MG/1
TABLET ORAL
Qty: 1 KIT | Refills: 0 | Status: SHIPPED | OUTPATIENT
Start: 2025-05-29 | End: 2025-05-29 | Stop reason: SDUPTHER

## 2025-05-29 RX ORDER — METHYLPREDNISOLONE 4 MG/1
TABLET ORAL
Qty: 1 KIT | Refills: 0 | Status: SHIPPED | OUTPATIENT
Start: 2025-05-29 | End: 2025-06-04

## 2025-05-29 SDOH — ECONOMIC STABILITY: FOOD INSECURITY: WITHIN THE PAST 12 MONTHS, YOU WORRIED THAT YOUR FOOD WOULD RUN OUT BEFORE YOU GOT MONEY TO BUY MORE.: NEVER TRUE

## 2025-05-29 SDOH — ECONOMIC STABILITY: FOOD INSECURITY: WITHIN THE PAST 12 MONTHS, THE FOOD YOU BOUGHT JUST DIDN'T LAST AND YOU DIDN'T HAVE MONEY TO GET MORE.: NEVER TRUE

## 2025-05-29 ASSESSMENT — PATIENT HEALTH QUESTIONNAIRE - PHQ9
2. FEELING DOWN, DEPRESSED OR HOPELESS: NOT AT ALL
SUM OF ALL RESPONSES TO PHQ QUESTIONS 1-9: 0
1. LITTLE INTEREST OR PLEASURE IN DOING THINGS: NOT AT ALL
SUM OF ALL RESPONSES TO PHQ QUESTIONS 1-9: 0

## 2025-05-29 ASSESSMENT — ENCOUNTER SYMPTOMS
RHINORRHEA: 0
NAUSEA: 0
ABDOMINAL PAIN: 0
SHORTNESS OF BREATH: 0
VOMITING: 0
COUGH: 0

## 2025-05-29 NOTE — TELEPHONE ENCOUNTER
Patient called in requesting the steroids from his visit today to go to [facility] on [address].

## 2025-05-29 NOTE — PROGRESS NOTES
[facility]  [facility] PRIMARY CARE  [address] [place] [address] [address]  [address]  [facility] [address]  Dept: [phone]  Dept Fax: [phone]    Morris Rivera is a 60 y.o. male who presents today for his medical conditions/complaints as noted below.    Chief Complaint   Patient presents with    Hand Pain     Right hand, thumb is painful, unsure of injury, sore and throbs at night, has popping sensation when moving, symptoms x 2 days; has used OTC Tylenol with minimal relief       HPI:     Patient presents with acute atraumatic right hand pain. He has a PMH of bilateral hip osteoarthritis. He writes very often at work, and is right handed.  Right hand pain is isolated to the base of the thumb.  No radiation.  No numbness or tingling.  No weakness.    We also discussed that he has not had any routine labs in several years. Patient is up 11 pounds since last August, we discussed watching his intake of ice cream. Patient has not had blood work in the last calendar year. BP stable.    Hand Pain   The incident occurred 2 days ago. The incident occurred at home. There was no injury mechanism. The pain is present in the right hand and right fingers. The quality of the pain is described as stabbing (throbbing). The pain does not radiate. The pain is at a severity of 7/10. The pain has been Worsening since the incident. Pertinent negatives include no chest pain, muscle weakness, numbness or tingling. The symptoms are aggravated by movement. He has tried acetaminophen for the symptoms. The treatment provided mild relief.       No results found for: \"LABA1C\"          ( goal A1C is < 7)   No components found for: \"LABMICR\"  No components found for: \"LDLCHOLESTEROL\", \"LDLCALC\"    (goal LDL is <100)   AST (U/L)   Date Value   03/28/2016 16     ALT (U/L)   Date Value   03/28/2016 14     BUN (mg/dL)   Date Value   03/28/2016 13     BP Readings from Last 3 Encounters:   05/29/25 124/86   08/07/24 128/80   07/15/24

## 2025-06-21 LAB
ALBUMIN: 4 G/DL
ALP BLD-CCNC: 68 U/L
ALT SERPL-CCNC: 14 U/L
ANION GAP SERPL CALCULATED.3IONS-SCNC: NORMAL MMOL/L
AST SERPL-CCNC: 14 U/L
BILIRUB SERPL-MCNC: 0.3 MG/DL (ref 0.1–1.4)
BUN BLDV-MCNC: 12 MG/DL
CALCIUM SERPL-MCNC: 9.4 MG/DL
CHLORIDE BLD-SCNC: 109 MMOL/L
CHOLESTEROL, FASTING: 175
CO2: 26 MMOL/L
CREAT SERPL-MCNC: 1.11 MG/DL
GFR, ESTIMATED: 77
GLUCOSE BLD-MCNC: 124 MG/DL
HDLC SERPL-MCNC: 35 MG/DL (ref 35–70)
LDL CHOLESTEROL: 112
POTASSIUM SERPL-SCNC: 4.2 MMOL/L
PROSTATE SPECIFIC ANTIGEN: 1.05 NG/ML
SODIUM BLD-SCNC: 145 MMOL/L
TOTAL PROTEIN: 6.6 G/DL (ref 6.4–8.2)
TRIGLYCERIDE, FASTING: 142
URIC ACID: 7.2

## 2025-06-23 ENCOUNTER — RESULTS FOLLOW-UP (OUTPATIENT)
Dept: PRIMARY CARE CLINIC | Age: 60
End: 2025-06-23

## 2025-06-23 ENCOUNTER — TELEPHONE (OUTPATIENT)
Dept: PRIMARY CARE CLINIC | Age: 60
End: 2025-06-23

## 2025-06-23 DIAGNOSIS — Z12.5 SCREENING PSA (PROSTATE SPECIFIC ANTIGEN): ICD-10-CM

## 2025-06-23 DIAGNOSIS — M79.644 PAIN OF RIGHT THUMB: ICD-10-CM

## 2025-06-23 DIAGNOSIS — Z00.00 ENCOUNTER FOR ANNUAL PHYSICAL EXAM: ICD-10-CM

## 2025-06-23 DIAGNOSIS — R73.09 ELEVATED GLUCOSE: Primary | ICD-10-CM

## 2025-06-23 NOTE — TELEPHONE ENCOUNTER
Patient states that he finished the methylprednisolone prescribed for his thumb pain, he reports that it did not help at all, asking if PCP can offer an alternative.    Patient had labs and xray done at [facility] over the weekend, writer requested results be faxed to our office.

## 2025-07-08 ENCOUNTER — OFFICE VISIT (OUTPATIENT)
Dept: PRIMARY CARE CLINIC | Age: 60
End: 2025-07-08
Payer: COMMERCIAL

## 2025-07-08 VITALS
HEART RATE: 65 BPM | RESPIRATION RATE: 15 BRPM | BODY MASS INDEX: 26.22 KG/M2 | OXYGEN SATURATION: 96 % | WEIGHT: 188 LBS | SYSTOLIC BLOOD PRESSURE: 120 MMHG | DIASTOLIC BLOOD PRESSURE: 80 MMHG

## 2025-07-08 DIAGNOSIS — Z12.11 ENCOUNTER FOR SCREENING FOR MALIGNANT NEOPLASM OF COLON: ICD-10-CM

## 2025-07-08 DIAGNOSIS — M65.311 TRIGGER THUMB OF RIGHT HAND: Primary | ICD-10-CM

## 2025-07-08 DIAGNOSIS — M79.644 PAIN OF RIGHT THUMB: ICD-10-CM

## 2025-07-08 PROCEDURE — 3017F COLORECTAL CA SCREEN DOC REV: CPT | Performed by: PHYSICIAN ASSISTANT

## 2025-07-08 PROCEDURE — 99213 OFFICE O/P EST LOW 20 MIN: CPT | Performed by: PHYSICIAN ASSISTANT

## 2025-07-08 PROCEDURE — G8419 CALC BMI OUT NRM PARAM NOF/U: HCPCS | Performed by: PHYSICIAN ASSISTANT

## 2025-07-08 PROCEDURE — 1036F TOBACCO NON-USER: CPT | Performed by: PHYSICIAN ASSISTANT

## 2025-07-08 PROCEDURE — G8427 DOCREV CUR MEDS BY ELIG CLIN: HCPCS | Performed by: PHYSICIAN ASSISTANT

## 2025-07-08 RX ORDER — CELECOXIB 100 MG/1
100 CAPSULE ORAL 2 TIMES DAILY
Qty: 60 CAPSULE | Refills: 0 | Status: SHIPPED | OUTPATIENT
Start: 2025-07-08

## 2025-07-08 SDOH — ECONOMIC STABILITY: FOOD INSECURITY: WITHIN THE PAST 12 MONTHS, YOU WORRIED THAT YOUR FOOD WOULD RUN OUT BEFORE YOU GOT MONEY TO BUY MORE.: NEVER TRUE

## 2025-07-08 SDOH — ECONOMIC STABILITY: FOOD INSECURITY: WITHIN THE PAST 12 MONTHS, THE FOOD YOU BOUGHT JUST DIDN'T LAST AND YOU DIDN'T HAVE MONEY TO GET MORE.: NEVER TRUE

## 2025-07-08 ASSESSMENT — PATIENT HEALTH QUESTIONNAIRE - PHQ9
SUM OF ALL RESPONSES TO PHQ QUESTIONS 1-9: 0
2. FEELING DOWN, DEPRESSED OR HOPELESS: NOT AT ALL
SUM OF ALL RESPONSES TO PHQ QUESTIONS 1-9: 0
1. LITTLE INTEREST OR PLEASURE IN DOING THINGS: NOT AT ALL
SUM OF ALL RESPONSES TO PHQ QUESTIONS 1-9: 0
SUM OF ALL RESPONSES TO PHQ QUESTIONS 1-9: 0

## 2025-07-08 ASSESSMENT — ENCOUNTER SYMPTOMS
SINUS PAIN: 0
SHORTNESS OF BREATH: 0
VOMITING: 0
EYE PAIN: 0
BACK PAIN: 0
CONSTIPATION: 0
COUGH: 0
ABDOMINAL PAIN: 0
RHINORRHEA: 0
DIARRHEA: 0
NAUSEA: 0

## 2025-07-08 NOTE — PROGRESS NOTES
[facility]  [facility] PRIMARY CARE  [address] [place] [address]  [address]  [facility] [address]  Dept: [phone]  Dept Fax: [phone]    Morris Rivera is a 60 y.o. male who presents today for his medical conditions/complaints as noted below.    Chief Complaint   Patient presents with    Pain     Right thumb pain x since May 2025       HPI:     Patient presents the office for evaluation of right thumb.  He has had persistent thumb pain for the last 6-8 weeks.  There is no injury or trauma.  Pain is predominantly in the base of the thumb and near the palm.  He has noticed some stiffness and locking sensation in the morning.  At last office visit he was given steroid pack which provided minimal relief.  He feels that his pain is getting worse.  There is no radiation of pain.  No numbness or tingling.  No other joint issue at this time.  No other acute changes or concerns.  BP stable.        No results found for: \"LABA1C\"          ( goal A1C is < 7)   No components found for: \"LABMICR\"  No components found for: \"LDLCHOLESTEROL\", \"LDLCALC\"    (goal LDL is <100)   AST (U/L)   Date Value   06/21/2025 14     ALT (U/L)   Date Value   06/21/2025 14     BUN (mg/dL)   Date Value   06/21/2025 12     BP Readings from Last 3 Encounters:   07/08/25 120/80   05/29/25 124/86   08/07/24 128/80          (goal 120/80)    History reviewed. No pertinent past medical history.   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